# Patient Record
Sex: FEMALE | Race: WHITE | NOT HISPANIC OR LATINO | URBAN - METROPOLITAN AREA
[De-identification: names, ages, dates, MRNs, and addresses within clinical notes are randomized per-mention and may not be internally consistent; named-entity substitution may affect disease eponyms.]

---

## 2022-03-25 ENCOUNTER — OFFICE (AMBULATORY)
Dept: URBAN - METROPOLITAN AREA PATHOLOGY 4 | Facility: PATHOLOGY | Age: 62
End: 2022-03-25

## 2022-03-25 ENCOUNTER — ON CAMPUS - OUTPATIENT (AMBULATORY)
Dept: URBAN - METROPOLITAN AREA HOSPITAL 2 | Facility: HOSPITAL | Age: 62
End: 2022-03-25
Payer: COMMERCIAL

## 2022-03-25 VITALS
SYSTOLIC BLOOD PRESSURE: 106 MMHG | OXYGEN SATURATION: 96 % | OXYGEN SATURATION: 97 % | DIASTOLIC BLOOD PRESSURE: 69 MMHG | HEIGHT: 66 IN | DIASTOLIC BLOOD PRESSURE: 94 MMHG | HEART RATE: 70 BPM | HEART RATE: 75 BPM | SYSTOLIC BLOOD PRESSURE: 108 MMHG | RESPIRATION RATE: 16 BRPM | SYSTOLIC BLOOD PRESSURE: 126 MMHG | OXYGEN SATURATION: 99 % | DIASTOLIC BLOOD PRESSURE: 83 MMHG | DIASTOLIC BLOOD PRESSURE: 90 MMHG | DIASTOLIC BLOOD PRESSURE: 72 MMHG | OXYGEN SATURATION: 100 % | DIASTOLIC BLOOD PRESSURE: 45 MMHG | HEART RATE: 64 BPM | SYSTOLIC BLOOD PRESSURE: 122 MMHG | WEIGHT: 176 LBS | SYSTOLIC BLOOD PRESSURE: 156 MMHG | HEART RATE: 67 BPM | HEART RATE: 47 BPM | RESPIRATION RATE: 18 BRPM | HEART RATE: 77 BPM | DIASTOLIC BLOOD PRESSURE: 91 MMHG | TEMPERATURE: 97.5 F | HEART RATE: 60 BPM | SYSTOLIC BLOOD PRESSURE: 134 MMHG | DIASTOLIC BLOOD PRESSURE: 82 MMHG | SYSTOLIC BLOOD PRESSURE: 123 MMHG

## 2022-03-25 DIAGNOSIS — D12.4 BENIGN NEOPLASM OF DESCENDING COLON: ICD-10-CM

## 2022-03-25 DIAGNOSIS — Z80.0 FAMILY HISTORY OF MALIGNANT NEOPLASM OF DIGESTIVE ORGANS: ICD-10-CM

## 2022-03-25 DIAGNOSIS — K62.1 RECTAL POLYP: ICD-10-CM

## 2022-03-25 DIAGNOSIS — D12.2 BENIGN NEOPLASM OF ASCENDING COLON: ICD-10-CM

## 2022-03-25 DIAGNOSIS — Z12.11 ENCOUNTER FOR SCREENING FOR MALIGNANT NEOPLASM OF COLON: ICD-10-CM

## 2022-03-25 PROBLEM — K63.5 POLYP OF COLON: Status: ACTIVE | Noted: 2022-03-25

## 2022-03-25 LAB
GI HISTOLOGY: A. UNSPECIFIED: (no result)
GI HISTOLOGY: B. UNSPECIFIED: (no result)
GI HISTOLOGY: C. UNSPECIFIED: (no result)
GI HISTOLOGY: PDF REPORT: (no result)

## 2022-03-25 PROCEDURE — 45385 COLONOSCOPY W/LESION REMOVAL: CPT | Mod: 33 | Performed by: INTERNAL MEDICINE

## 2022-03-25 PROCEDURE — 88305 TISSUE EXAM BY PATHOLOGIST: CPT | Performed by: INTERNAL MEDICINE

## 2023-10-30 ENCOUNTER — APPOINTMENT (OUTPATIENT)
Dept: GENERAL RADIOLOGY | Facility: HOSPITAL | Age: 63
End: 2023-10-30
Payer: MEDICARE

## 2023-10-30 ENCOUNTER — HOSPITAL ENCOUNTER (OUTPATIENT)
Facility: HOSPITAL | Age: 63
Discharge: HOME | End: 2023-10-30
Admitting: EMERGENCY MEDICINE
Payer: MEDICARE

## 2023-10-30 VITALS
DIASTOLIC BLOOD PRESSURE: 59 MMHG | OXYGEN SATURATION: 96 % | WEIGHT: 180 LBS | RESPIRATION RATE: 16 BRPM | BODY MASS INDEX: 28.25 KG/M2 | TEMPERATURE: 97.8 F | HEIGHT: 67 IN | SYSTOLIC BLOOD PRESSURE: 114 MMHG | HEART RATE: 54 BPM

## 2023-10-30 DIAGNOSIS — M79.671 ACUTE FOOT PAIN, RIGHT: Primary | ICD-10-CM

## 2023-10-30 PROCEDURE — 73630 X-RAY EXAM OF FOOT: CPT

## 2023-10-30 PROCEDURE — G0463 HOSPITAL OUTPT CLINIC VISIT: HCPCS | Performed by: NURSE PRACTITIONER

## 2023-10-30 RX ORDER — IBUPROFEN 600 MG/1
600 TABLET ORAL EVERY 6 HOURS PRN
Qty: 30 TABLET | Refills: 0 | Status: SHIPPED | OUTPATIENT
Start: 2023-10-30

## 2023-10-31 NOTE — DISCHARGE INSTRUCTIONS
Ice, Ice, Ice    Elevate foot the next several days while icing    Motrin 600 mg every 6 hours plus 500 mg tylenol    Return Precautions    Although you are being discharged from the ED today, I encourage you to return for worsening symptoms.  Things can, and do, change such that treatment at home with medication may not be adequate.      Specifically, return for any of the following:    Chest pain, shortness of breath, pain or nausea and vomiting not controlled by medications provided.    Please make a follow up with your Primary Care Provider for a blood pressure recheck.

## 2023-10-31 NOTE — [facility] PROVIDER NOTE
EMERGENCY DEPARTMENT ENCOUNTER    Room Number:  09/09  Date seen:  10/30/2023  Time seen: 21:33 EDT  PCP: Marilee Desai MD  Historian: patient    Discussed/obtained information from independent historians: n/a    HPI:  Chief complaint:right foot pain/injury  A complete HPI/ROS/PMH/PSH/SH/FH are unobtainable due to: n/a  Context:Emerita Calderon is a 63 y.o. female who presents to the ED with c/o acute right foot pain that is moderate.  States her dog took off while on a leash and she stumbled to try and prevent the dog from leaving.  She rolled the foot and has moderate pain.  This happened at 3 Pm and she has been home elevating the foot since then.  Pain is made worse by ambulation but she is able to ambulate. Has not had this problem before.  Denies loss of sensation or other injury.     External (non-ED) record review:  n/a      Chronic or social conditions impacting care:    ALLERGIES  Oxybutynin    PAST MEDICAL HISTORY  Active Ambulatory Problems     Diagnosis Date Noted    No Active Ambulatory Problems     Resolved Ambulatory Problems     Diagnosis Date Noted    No Resolved Ambulatory Problems     No Additional Past Medical History       PAST SURGICAL HISTORY  History reviewed. No pertinent surgical history.    FAMILY HISTORY  History reviewed. No pertinent family history.    SOCIAL HISTORY  Social History     Socioeconomic History    Marital status:    Tobacco Use    Smoking status: Never   Substance and Sexual Activity    Alcohol use: Yes     Alcohol/week: 3.0 standard drinks of alcohol     Types: 3 Glasses of wine per week       REVIEW OF SYSTEMS  Review of Systems    All systems reviewed and negative except for those discussed in HPI.     PHYSICAL EXAM    I have reviewed the triage vital signs and nursing notes.  Vitals:    10/30/23 2048   BP: 114/59   Pulse:    Resp:    Temp:    SpO2:      Physical Exam    GENERAL: not distressed  HENT: nares patent  EYES: no scleral icterus  NECK:  no ROM limitations  CV: regular rhythm, regular rate, no murmur  RESPIRATORY: normal effort  ABDOMEN: soft  : deferred  MUSCULOSKELETAL: right dorsal foot swelling and tenderness.  No bony stepoff's.  Compartments are soft.   There is no pulse discrepancy.  Can wiggle toes.   NEURO: alert, moves all extremities, follows commands  SKIN: warm, dry      RADIOLOGY RESULTS  XR Foot 3+ View Right    Result Date: 10/30/2023  XR FOOT 3+ VW RIGHT Date of Exam: 10/30/2023 7:47 PM CDT Indication: right foot injkury Comparison: None available. Findings: No evidence of acute fracture nor dislocation. There is a small to moderate-sized plantar calcaneal spur. Alignment is normal. Mild first MTP joint degenerative arthrosis. Soft tissues are unremarkable.     Impression: No acute process identified. Electronically Signed: Per Sommer MD  10/30/2023 8:21 PM CDT  Workstation ID: [id number]      Ordered the above noted radiological studies.  Independently interpreted by me.  My findings will be discussed in the medical decision section below.     PROGRESS, DATA ANALYSIS, CONSULTS AND MEDICAL DECISION MAKING    Please note that this section constitutes my independent interpretation of clinical data including lab results, radiology, EKG's.  This constitutes my independent professional opinion regarding differential diagnosis and management of this patient.  It may include any factors such as history from outside sources, review of external records, social determinants of health, management of medications, response to those treatments, and discussions with other providers.    ED Course as of 10/30/23 2201   Mon Oct 30, 2023   2132 I viewed right foot images in  PACS.  My independent interpretation is no acute fracture or dislocation.  [EW]      ED Course User Index  [EW] Arabella Amos APRN     Orders placed during this visit:  Orders Placed This Encounter   Procedures    XR Foot 3+ View Right    Apply ace wrap            Medical  Decision Making  Problems Addressed:  Acute foot pain, right: complicated acute illness or injury    Risk  Prescription drug management.    Imaging negative for any acute fracture.  The DP and PT are palpable.  She can wiggle toes and there is brisk capillary refill to the digits.  Compartments are soft, no loss of sensation.  I did consider a boot but think that due to swelling in the foot this would be more uncomfortable than help.  Discussed treating pain with Motrin and Tylenol, ice and elevation.         DIAGNOSIS  Final diagnoses:   Acute foot pain, right          Medication List        New Prescriptions      ibuprofen 600 MG tablet  Commonly known as: ADVIL,MOTRIN  Take 1 tablet by mouth Every 6 (Six) Hours As Needed for Moderate Pain.               Where to Get Your Medications        These medications were sent to [id number] - [place], [place] - [address] AT [place] [address] & [facility] - [phone]  - [phone] FX  [address] [facility] [place] [address]      Phone: [phone]   ibuprofen 600 MG tablet         FOLLOW-UP  KE Garcia DPM  [address] [place] [address] [phone]    Schedule an appointment as soon as possible for a visit       Elmer Peraza MD  [address] [facility] [place] [address] [phone]    Schedule an appointment as soon as possible for a visit           Latest Documented Vital Signs:  As of 22:01 EDT  BP- 114/59 HR- 54 Temp- 97.8 °F (36.6 °C) O2 sat- 96%    Appropriate PPE utilized throughout this patient encounter to include mask, if indicated, per current protocol. Hand hygiene was performed before donning PPE and after removal when leaving the room.    Please note that portions of this were completed with a voice recognition program.     Note Disclaimer: At [facility], we believe that sharing information builds trust and better relationships. You are receiving this note because you are receiving  care at [facility] or recently visited. It is possible you will see health information before a provider has talked with you about it. This kind of information can be easy to misunderstand. To help you fully understand what it means for your health, we urge you to discuss this note with your provider.